# Patient Record
Sex: FEMALE | Race: WHITE | NOT HISPANIC OR LATINO
[De-identification: names, ages, dates, MRNs, and addresses within clinical notes are randomized per-mention and may not be internally consistent; named-entity substitution may affect disease eponyms.]

---

## 2022-03-16 ENCOUNTER — APPOINTMENT (OUTPATIENT)
Dept: NEPHROLOGY | Facility: CLINIC | Age: 57
End: 2022-03-16
Payer: COMMERCIAL

## 2022-03-16 VITALS — HEART RATE: 96 BPM | DIASTOLIC BLOOD PRESSURE: 85 MMHG | SYSTOLIC BLOOD PRESSURE: 144 MMHG

## 2022-03-16 VITALS — DIASTOLIC BLOOD PRESSURE: 83 MMHG | HEART RATE: 84 BPM | SYSTOLIC BLOOD PRESSURE: 132 MMHG

## 2022-03-16 DIAGNOSIS — Z15.09 GENETIC SUSCEPTIBILITY TO MALIGNANT NEOPLASM OF BREAST: ICD-10-CM

## 2022-03-16 DIAGNOSIS — N18.2 CHRONIC KIDNEY DISEASE, STAGE 2 (MILD): ICD-10-CM

## 2022-03-16 DIAGNOSIS — Z87.448 PERSONAL HISTORY OF OTHER DISEASES OF URINARY SYSTEM: ICD-10-CM

## 2022-03-16 DIAGNOSIS — Z85.3 PERSONAL HISTORY OF MALIGNANT NEOPLASM OF BREAST: ICD-10-CM

## 2022-03-16 DIAGNOSIS — Z15.01 GENETIC SUSCEPTIBILITY TO MALIGNANT NEOPLASM OF BREAST: ICD-10-CM

## 2022-03-16 DIAGNOSIS — Z87.440 PERSONAL HISTORY OF URINARY (TRACT) INFECTIONS: ICD-10-CM

## 2022-03-16 PROCEDURE — 36415 COLL VENOUS BLD VENIPUNCTURE: CPT

## 2022-03-16 PROCEDURE — 99205 OFFICE O/P NEW HI 60 MIN: CPT | Mod: 25

## 2022-03-16 PROCEDURE — 93000 ELECTROCARDIOGRAM COMPLETE: CPT

## 2022-03-16 NOTE — ADDENDUM
[FreeTextEntry1] : All medical record entries made by the Scribe were at my, Dr. Chano Ariza, direction and personally dictated by me on 03/16/2022.  I have reviewed the chart and agree that the record accurately reflects my personal performance of the history, physical exam, assessment and plan. I have also personally directed, reviewed, and agreed with the chart.\par

## 2022-03-16 NOTE — HISTORY OF PRESENT ILLNESS
[FreeTextEntry1] : The patient is a 56 year old female presenting for initial evaluation of renal function. Referred by urologist Dr. Grant.\par \par Chief Complaint: The patient presents for 24HR urine collection to evaluate her renal function. She reports her creatinine over the last few years has been in range of 1.07-1.27, with eGFR 47-54. She denies doing any heavy exercise. She reports urinary urgency which is followed by Dr. Grant. \par - She states she is up 10lbs from her baseline of 150lbs and is currently at 160lbs due to COVID pandemic.\par - Does not have regular cardiac f/u.\par - PCP is Dr. Karina Almanzar.\par - Sees Dr. Omar Rios for osteoporosis. \par - Sees Dr. Mitesh Tyler for migraines with aura. (Receives Botox injection treatment).\par \par Past Medical History:\par - on prophylactic antibiotics through her childhood due to renal infection (2-3 years old). Kidneys are reportedly small and scarred.\par - had undiagnosed bladder infection as a child \par - breast cancer (, treated with chemotherapy, oophorectomy, double mastectomy) \par - positive BRCA gene\par - osteoporosis\par - migraines with aura\par - melanoma\par - dry skin\par - HLD\par - 2 full term vaginal deliveries\par - knee pain\par - h/o mitral valve prolapse, palpitations (has had stress test with palpitations, does not currently f/u with cardiologist)\par - denies any h/o thyroid issues, blood disorders, lung problems, GI issues\par \par Past Surgical History/ Hospitalizations:\par - urethroplasty\par - oophorectomy (due to positive BRCA gene)\par - double mastectomy\par - hospitalized for renal infection with fever of 106 at age 2-3\par - surgery for melanoma in situ on thigh, widely resected.\par \par Family History:\par - mother, 88, overweight, healthy, pre-diabetic, thyroid (negative for BRCA)\par - father, , Alzheimer's, HLD\par - grandmother,  70s from stroke\par \par Social History:\par - Lives on [facility]\par - 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

## 2022-03-16 NOTE — PHYSICAL EXAM
[General Appearance - Alert] : alert [General Appearance - In No Acute Distress] : in no acute distress [Sclera] : the sclera and conjunctiva were normal [PERRL With Normal Accommodation] : pupils were equal in size, round, and reactive to light [Extraocular Movements] : extraocular movements were intact [Outer Ear] : the ears and nose were normal in appearance [Hearing Threshold Finger Rub Not [place]] : hearing was normal [Neck Appearance] : the appearance of the neck was normal [Neck Cervical Mass (___cm)] : no neck mass was observed [Jugular Venous Distention Increased] : there was no jugular-venous distention [Thyroid Diffuse Enlargement] : the thyroid was not enlarged [Thyroid Nodule] : there were no palpable thyroid nodules [Auscultation Breath Sounds / Voice Sounds] : lungs were clear to auscultation bilaterally [Heart Rate And Rhythm] : heart rate was normal and rhythm regular [Heart Sounds] : normal S1 and S2 [Heart Sounds Gallop] : no gallops [Murmurs] : no murmurs [Heart Sounds Pericardial Friction Rub] : no pericardial rub [Edema] : there was no peripheral edema [Abdomen Soft] : soft [Abdomen Tenderness] : non-tender [Abdomen Mass (___ Cm)] : no abdominal mass palpated [No CVA Tenderness] : no ~M costovertebral angle tenderness [No Spinal Tenderness] : no spinal tenderness [Abnormal Walk] : normal gait [Involuntary Movements] : no involuntary movements were seen [Musculoskeletal - Swelling] : no joint swelling seen [Motor Tone] : muscle strength and tone were normal [Skin Color & Pigmentation] : normal skin color and pigmentation [Skin Turgor] : normal skin turgor [] : no rash [No Focal Deficits] : no focal deficits [Oriented To Time, Place, And Person] : oriented to person, place, and time [Impaired Insight] : insight and judgment were intact [Affect] : the affect was normal [FreeTextEntry1] : active bowel sounds

## 2022-03-16 NOTE — ASSESSMENT
[FreeTextEntry1] : Plan:\par 1) BP initially elevated but lowered to acceptable over the course of the visit. Will reassess pressures at next visit.\par 2) Osteoporosis: Will plan to speak with Dr. Omar Rios in the future to discuss best plan of treatment for patient's osteoporosis and avoid risk of renal injury.\par 3) Renal function: Will draw labs to assess baseline renal function including Cystatin C. Prior sonographic imaging has been scanned into records. Will also order 24 hour urine test to assess urine creatinine. Have reviewed with patient proper instructions for collecting urine and answered all relevant questions to her apparent satisfaction.\par 4) EKG: EKG taken today reveals normal tracing.\par 5) Patient concerned that her insurance will not cover necessary labs. Have requested patient send me records of most recent labs to avoid unnecessary billing.\par \par No changes to medications.\par \par EKG taken today, results as above. Labs were drawn and patient will return in 1 month for a follow-up appointment.

## 2022-03-18 ENCOUNTER — LABORATORY RESULT (OUTPATIENT)
Age: 57
End: 2022-03-18

## 2022-03-20 LAB
ALBUMIN SERPL ELPH-MCNC: 4.8 G/DL
ALP BLD-CCNC: 84 U/L
ALT SERPL-CCNC: 17 U/L
ANION GAP SERPL CALC-SCNC: 17 MMOL/L
APPEARANCE: CLEAR
AST SERPL-CCNC: 23 U/L
BACTERIA UR CULT: NORMAL
BACTERIA: NEGATIVE
BASOPHILS # BLD AUTO: 0.03 K/UL
BASOPHILS NFR BLD AUTO: 0.4 %
BILIRUB SERPL-MCNC: 0.2 MG/DL
BILIRUBIN URINE: NEGATIVE
BLOOD URINE: NEGATIVE
BUN SERPL-MCNC: 22 MG/DL
CALCIUM SERPL-MCNC: 10 MG/DL
CHLORIDE SERPL-SCNC: 104 MMOL/L
CHOLEST SERPL-MCNC: 294 MG/DL
CO2 SERPL-SCNC: 21 MMOL/L
COLOR: NORMAL
CREAT 24H UR-MCNC: NORMAL G/24 H
CREAT ?TM UR-MCNC: 69 MG/DL
CREAT ?TM UR-MCNC: 69 MG/DL
CREAT ?TM UR-MCNC: 73 MG/DL
CREAT ?TM UR-MCNC: 73 MG/DL
CREAT SERPL-MCNC: 1.25 MG/DL
CREAT SPEC-SCNC: 71 MG/DL
CREAT/PROT UR: 0.2 RATIO
CYSTATIN C SERPL-MCNC: 1.09 MG/L
EGFR: 50 ML/MIN/1.73M2
EOSINOPHIL # BLD AUTO: 0.04 K/UL
EOSINOPHIL NFR BLD AUTO: 0.5 %
GFR/BSA.PRED SERPLBLD CYS-BASED-ARV: 65 ML/MIN/1.73M2
GLUCOSE QUALITATIVE U: NEGATIVE
GLUCOSE SERPL-MCNC: 100 MG/DL
HCT VFR BLD CALC: 46.1 %
HDLC SERPL-MCNC: 56 MG/DL
HGB BLD-MCNC: 14.7 G/DL
HYALINE CASTS: 0 /LPF
IMM GRANULOCYTES NFR BLD AUTO: 0.4 %
KETONES URINE: NEGATIVE
LDLC SERPL CALC-MCNC: 187 MG/DL
LEUKOCYTE ESTERASE URINE: NEGATIVE
LYMPHOCYTES # BLD AUTO: 2.88 K/UL
LYMPHOCYTES NFR BLD AUTO: 38.3 %
MAGNESIUM SERPL-MCNC: 2.3 MG/DL
MAN DIFF?: NORMAL
MCHC RBC-ENTMCNC: 28.1 PG
MCHC RBC-ENTMCNC: 31.9 GM/DL
MCV RBC AUTO: 88 FL
MICROSCOPIC-UA: NORMAL
MONOCYTES # BLD AUTO: 0.44 K/UL
MONOCYTES NFR BLD AUTO: 5.9 %
NEUTROPHILS # BLD AUTO: 4.1 K/UL
NEUTROPHILS NFR BLD AUTO: 54.5 %
NITRITE URINE: NEGATIVE
NONHDLC SERPL-MCNC: 238 MG/DL
PH URINE: 6.5
PHOSPHATE SERPL-MCNC: 3.6 MG/DL
PLATELET # BLD AUTO: 241 K/UL
POTASSIUM SERPL-SCNC: 4.1 MMOL/L
PROT 24H UR-MRATE: 10 MG/DL
PROT 24H UR-MRATE: 11 MG/DL
PROT ?TM UR-MCNC: NORMAL
PROT SERPL-MCNC: 7.8 G/DL
PROT UR-MCNC: 11 MG/DL
PROT UR-MCNC: NORMAL
PROT UR-MCNC: NORMAL
PROTEIN URINE: NEGATIVE
RBC # BLD: 5.24 M/UL
RBC # FLD: 13 %
RED BLOOD CELLS URINE: 1 /HPF
SODIUM SERPL-SCNC: 142 MMOL/L
SPECIFIC GRAVITY URINE: 1.02
SPECIMEN VOL 24H UR: NORMAL
SQUAMOUS EPITHELIAL CELLS: 0 /HPF
TRIGL SERPL-MCNC: 260 MG/DL
TSH SERPL-ACNC: 2.02 UIU/ML
URATE SERPL-MCNC: 4.8 MG/DL
UROBILINOGEN URINE: NORMAL
WBC # FLD AUTO: 7.52 K/UL
WHITE BLOOD CELLS URINE: 0 /HPF

## 2022-04-27 RX ORDER — ROSUVASTATIN CALCIUM 10 MG/1
10 TABLET, FILM COATED ORAL
Qty: 90 | Refills: 3 | Status: ACTIVE | COMMUNITY
Start: 2022-04-27 | End: 1900-01-01